# Patient Record
Sex: MALE | Race: WHITE | Employment: FULL TIME | URBAN - METROPOLITAN AREA
[De-identification: names, ages, dates, MRNs, and addresses within clinical notes are randomized per-mention and may not be internally consistent; named-entity substitution may affect disease eponyms.]

---

## 2021-05-28 ENCOUNTER — RECORDS - HEALTHEAST (OUTPATIENT)
Dept: ADMINISTRATIVE | Facility: CLINIC | Age: 66
End: 2021-05-28

## 2021-05-31 ENCOUNTER — RECORDS - HEALTHEAST (OUTPATIENT)
Dept: ADMINISTRATIVE | Facility: CLINIC | Age: 66
End: 2021-05-31

## 2021-07-13 ENCOUNTER — RECORDS - HEALTHEAST (OUTPATIENT)
Dept: ADMINISTRATIVE | Facility: CLINIC | Age: 66
End: 2021-07-13

## 2023-02-17 ENCOUNTER — TRANSFERRED RECORDS (OUTPATIENT)
Dept: HEALTH INFORMATION MANAGEMENT | Facility: CLINIC | Age: 68
End: 2023-02-17

## 2023-05-10 ENCOUNTER — TRANSFERRED RECORDS (OUTPATIENT)
Dept: HEALTH INFORMATION MANAGEMENT | Facility: CLINIC | Age: 68
End: 2023-05-10

## 2023-05-10 ENCOUNTER — MEDICAL CORRESPONDENCE (OUTPATIENT)
Dept: HEALTH INFORMATION MANAGEMENT | Facility: CLINIC | Age: 68
End: 2023-05-10

## 2023-05-16 ENCOUNTER — TRANSCRIBE ORDERS (OUTPATIENT)
Dept: OTHER | Age: 68
End: 2023-05-16

## 2023-05-16 DIAGNOSIS — H20.9 UVEITIS: Primary | ICD-10-CM

## 2023-05-16 DIAGNOSIS — H20.021 IRITIS, RECURRENT, RIGHT: ICD-10-CM

## 2023-06-04 ENCOUNTER — HEALTH MAINTENANCE LETTER (OUTPATIENT)
Age: 68
End: 2023-06-04

## 2023-06-21 ENCOUNTER — OFFICE VISIT (OUTPATIENT)
Dept: OPHTHALMOLOGY | Facility: CLINIC | Age: 68
End: 2023-06-21
Attending: OPHTHALMOLOGY
Payer: COMMERCIAL

## 2023-06-21 DIAGNOSIS — D31.31 CHOROIDAL NEVUS OF RIGHT EYE: ICD-10-CM

## 2023-06-21 DIAGNOSIS — H20.00 HLA-B27 ASSOCIATED ACUTE ANTERIOR UVEITIS: Primary | ICD-10-CM

## 2023-06-21 PROCEDURE — G0463 HOSPITAL OUTPT CLINIC VISIT: HCPCS | Performed by: OPHTHALMOLOGY

## 2023-06-21 PROCEDURE — 99203 OFFICE O/P NEW LOW 30 MIN: CPT | Mod: GC | Performed by: OPHTHALMOLOGY

## 2023-06-21 RX ORDER — VIT C/B6/B5/MAGNESIUM/HERB 173 50-5-6-5MG
CAPSULE ORAL
COMMUNITY

## 2023-06-21 RX ORDER — CHLORHEXIDINE GLUCONATE ORAL RINSE 1.2 MG/ML
SOLUTION DENTAL
COMMUNITY
Start: 2022-10-28

## 2023-06-21 RX ORDER — DIFLUPREDNATE OPHTHALMIC 0.5 MG/ML
EMULSION OPHTHALMIC
COMMUNITY
Start: 2023-05-10

## 2023-06-21 ASSESSMENT — TONOMETRY
OS_IOP_MMHG: 16
OD_IOP_MMHG: 15
IOP_METHOD: TONOPEN

## 2023-06-21 ASSESSMENT — EXTERNAL EXAM - RIGHT EYE: OD_EXAM: NORMAL

## 2023-06-21 ASSESSMENT — CONF VISUAL FIELD
OD_INFERIOR_NASAL_RESTRICTION: 0
OS_SUPERIOR_NASAL_RESTRICTION: 0
OS_SUPERIOR_TEMPORAL_RESTRICTION: 0
OD_SUPERIOR_NASAL_RESTRICTION: 0
OD_INFERIOR_TEMPORAL_RESTRICTION: 0
OD_NORMAL: 1
OS_NORMAL: 1
OS_INFERIOR_NASAL_RESTRICTION: 0
OD_SUPERIOR_TEMPORAL_RESTRICTION: 0
OS_INFERIOR_TEMPORAL_RESTRICTION: 0
METHOD: COUNTING FINGERS

## 2023-06-21 ASSESSMENT — VISUAL ACUITY
OS_CC+: -2
OD_CC: 20/25
CORRECTION_TYPE: GLASSES
METHOD: SNELLEN - LINEAR
OS_CC: 20/20

## 2023-06-21 ASSESSMENT — SLIT LAMP EXAM - LIDS
COMMENTS: NORMAL
COMMENTS: NORMAL

## 2023-06-21 ASSESSMENT — EXTERNAL EXAM - LEFT EYE: OS_EXAM: NORMAL

## 2023-06-21 ASSESSMENT — CUP TO DISC RATIO
OS_RATIO: 0.2
OD_RATIO: 0.2

## 2023-06-21 NOTE — PATIENT INSTRUCTIONS
Please continue the Difluprednate once a day with the last dose on Wed, 6/28  Keep the appointment with Dr. Alberts and let us know if there are any questions  We will let you know about any other ideas for supplements or other treatments.

## 2023-06-21 NOTE — PROGRESS NOTES
Chief Complaint/Presenting Concern: Recurrent iritis, right eye    History of Present Illness:   Olman Walton is a 67 year old patient who presents for evaluation of recurrent iritis right eye.     Mr. Walton reports the uveitis symptoms in the right eye started about 3 years ago. He saw a doctor at [facility] when symptoms started, who diagnosed him with uveitis of the right eye. His symptoms have been occurring more frequently over the past year and typically include redness,very mildly pain and mild photophobia and respond to steroid drops. He can sense a flare starting when he has a headache behind the right eye and it builds and then the flare occurs. This is some ear sensation when this happens but never builds to anything. The left eye may have had one episode previously. When flares occur, Mr. Walton will see Eye Doctor at [facility] and has been typically prescribed a taper of durezol drops.     Mr. Walton saw Dr. Becerra at [facility] on 5/10/23, who noted no cell or flare in the right eye, but Mr. Walton was noticing pain and irritation. He was restarted on a durezol drop taper for the right eye. About one week ago the right eye started to have symptoms, so he saw another doctor at [facility] who then restarted Durezol daily in the right eye.     Today, Mr. Walton reports the right eye feels no discomfort and the redness feels at baseline. He has been taking durezol once per day in the right eye for about one week. No big changes in chronic floaters. No flashes of light. His symptoms do not seem to have any seasonal variation, no history of allergies. Mr. Walton has not ever had to take oral steroid for this eye inflammation.    Additional Ocular History:   1. Myopia with glasses wear (no contacts).     Relevant Past Medical/Family/Social History:  1. HLA-B27 positive. He saw Rheumatology 3/13/23, who did not identity any spondyloarthropathy.   2. No other medical history per  "patient. No history of DM, HTN, HLD, cancers, strokes, blood clots, or other issues.   3. No COVID infection, and has been vaccinated against COVID  4. History of chicken pox in childhood but no history of shingles.     No family history of autoimmune problems. No recent travel out of the country. Not aware of any bug or tick bites. No drug use. No pets at home.Will travel to [place] in September for his Son's Wedding.    Works in an [employer] office. No work exposures to chemicals     Relevant Review of Systems:  Infrequently gets mild cold sores aphthous ulcers in the mouth.    No recent increase in muscle or joint pain, describes mild \"age-related\" aches in \"every joint\". No skin rashes. No blood in stool or urine.     Denies chills, fevers, night sweats, unintentional weight loss, chest pain, SOB, abdominal pain, pain on urination, BLE edema.     Laboratory Testin23: HLA-B27 positive.   23: Normal/negative: Lyme, treponema, quantiferon, CBC, lysozyme, ESR, RF, CRP, ACE, ANCA, DEBBIE, urine albumin, UA.      Current eye related medications: Durezol once daily right eye (for the last week) Turmeric supplements, Fish oil, no drops left eye     Retina/Uveitis Imaging:None     Assessment:    1. HLA-B27 associated acute anterior uveitis  No active inflammation today    2. Choroidal nevus of right eye  Less than 1 disc diameter in size, no high risk features.     Plan/Recommendations:    Discussed findings with patient. The flares of anterior uveitis (iritis) do respond quickly to difluprednate (Durezol) drops.  The pupil dilates well without synechiae and there are some likely aging floaters in the vitreous but no evidence of posterior segment inflammation.    We discussed that the likely cause of the recurrent episodes of iritis in the right eye is the genetic marker HLA-B27.  We reviewed that this typically manifest with uveitis earlier and earlier adulthood, but may not always be the case.  We also " discussed that this entity typically is associated with inflammation of both eyes although less frequently simultaneously.  Mr. Walton does recall perhaps 1 episode in the left eye but there have not been any symptoms in some time nor is there any inflammation in the left eye currently    Given the absence of systemic inflammatory symptoms and a negative evaluation by Rheumatology for underlying associated inflammatory conditions, no other laboratory evaluations are recommended at this time.    Given the history of cold sores there is a possibility of viral mediated anterior uveitis, although typical signs such as iris atrophy, fine stellate keratic precipitates and elevated eye pressure have not been noted.    We discussed that the treatment of flares when they occur with the difluprednate steroid drop has worked well.  We reviewed that there are patients who may have other associated HLA-B27 conditions affecting other parts of the body, and some of these patients are on immunosuppressive therapy to reduce the risk of inflammation of the eye and other body parts.  There does not seem to be an indication for considering such treatment at this time given the episodic nature and prompt response to the steroid drops    We discussed the use of regular maintenance steroid drops but this seems less likely to be needed given episodic nature and risk of steroid related side effects of cataract progression and elevated eye pressure.     Eye pressure is 15/16 today. This can be monitored and is encouraging as there have not been any issues with eye pressure elevation.    Please continue the Difluprednate once a day in the right eye with the last dose on Wed, 6/28/23, then stop     Keep the appointment with Dr. Alberts and let us know if there are any questions    We discussed vitamins and supplements, including Turmeric which Mr. Walton is already taking. We did not have any other recommendations currently but can share with  him later if anything comes to mind.    RTC July 26 with Dr. Alberts at [facility], Here ELIF Zhang MD  Ophthalmology Resident PGY-3    Attending Physician Attestation:  Complete documentation of historical and exam elements from today's encounter can be found in the full encounter summary report (not reduplicated in this progress note). I reviewed the chief complaint(s) and history of present illness, and  confirmed and edited as necessary the review of systems, past medical/surgical history, family history, social history, and examination findings as documented by others and the treating Resident or Fellow Physician.    I examined the patient myself, discussed the findings, reviewed all ancillary testing data and modified these results and reports along with the assessment and plan with the Treating Resident or Fellow Physician. I agree with the note as detailed above.   Jay Munoz M.D.  Uveitis and Medical Retina  June 21, 2023

## 2023-06-21 NOTE — LETTER
6/21/2023       RE: Olman Walton  [address] [address]     Dear Colleague,    Thank you for referring your patient, Olman Walton, to the [facility] at [facility]. Please see a copy of my visit note below.    Chief Complaint/Presenting Concern: Recurrent iritis, right eye    History of Present Illness:   Olman Walton is a 67 year old patient who presents for evaluation of recurrent iritis right eye.     Mr. Walton reports the uveitis symptoms in the right eye started about 3 years ago. He saw a doctor at [facility] when symptoms started, who diagnosed him with uveitis of the right eye. His symptoms have been occurring more frequently over the past year and typically include redness,very mildly pain and mild photophobia and respond to steroid drops. He can sense a flare starting when he has a headache behind the right eye and it builds and then the flare occurs. This is some ear sensation when this happens but never builds to anything. The left eye may have had one episode previously. When flares occur, Mr. Walton will see Eye Doctor at [facility] and has been typically prescribed a taper of durezol drops.     Mr. Walton saw Dr. Becerra at [facility] on 5/10/23, who noted no cell or flare in the right eye, but Mr. Walton was noticing pain and irritation. He was restarted on a durezol drop taper for the right eye. About one week ago the right eye started to have symptoms, so he saw another doctor at [facility] who then restarted Durezol daily in the right eye.     Today, Mr. Walton reports the right eye feels no discomfort and the redness feels at baseline. He has been taking durezol once per day in the right eye for about one week. No big changes in chronic floaters. No flashes of light. His symptoms do not seem to have any seasonal variation, no history of allergies. Mr. Walton has not ever had to take  "oral steroid for this eye inflammation.    Additional Ocular History:   1. Myopia with glasses wear (no contacts).     Relevant Past Medical/Family/Social History:  1. HLA-B27 positive. He saw Rheumatology 3/13/23, who did not identity any spondyloarthropathy.   2. No other medical history per patient. No history of DM, HTN, HLD, cancers, strokes, blood clots, or other issues.   3. No COVID infection, and has been vaccinated against COVID  4. History of chicken pox in childhood but no history of shingles.     No family history of autoimmune problems. No recent travel out of the country. Not aware of any bug or tick bites. No drug use. No pets at home.Will travel to [place] in September for his Son's Wedding.    Works in an [employer] office. No work exposures to chemicals     Relevant Review of Systems:  Infrequently gets mild cold sores aphthous ulcers in the mouth.    No recent increase in muscle or joint pain, describes mild \"age-related\" aches in \"every joint\". No skin rashes. No blood in stool or urine.     Denies chills, fevers, night sweats, unintentional weight loss, chest pain, SOB, abdominal pain, pain on urination, BLE edema.     Laboratory Testin23: HLA-B27 positive.   23: Normal/negative: Lyme, treponema, quantiferon, CBC, lysozyme, ESR, RF, CRP, ACE, ANCA, DEBBIE, urine albumin, UA.      Current eye related medications: Durezol once daily right eye (for the last week) Turmeric supplements, Fish oil, no drops left eye     Retina/Uveitis Imaging:None     Assessment:    1. HLA-B27 associated acute anterior uveitis  No active inflammation today    2. Choroidal nevus of right eye  Less than 1 disc diameter in size, no high risk features.     Plan/Recommendations:  Discussed findings with patient. The flares of anterior uveitis (iritis) do respond quickly to difluprednate (Durezol) drops.  The pupil dilates well without synechiae and there are some likely aging floaters in the vitreous but no " evidence of posterior segment inflammation.  We discussed that the likely cause of the recurrent episodes of iritis in the right eye is the genetic marker HLA-B27.  We reviewed that this typically manifest with uveitis earlier and earlier adulthood, but may not always be the case.  We also discussed that this entity typically is associated with inflammation of both eyes although less frequently simultaneously.  Mr. Walton does recall perhaps 1 episode in the left eye but there have not been any symptoms in some time nor is there any inflammation in the left eye currently  Given the absence of systemic inflammatory symptoms and a negative evaluation by Rheumatology for underlying associated inflammatory conditions, no other laboratory evaluations are recommended at this time.    Given the history of cold sores there is a possibility of viral mediated anterior uveitis, although typical signs such as iris atrophy, fine stellate keratic precipitates and elevated eye pressure have not been noted.  We discussed that the treatment of flares when they occur with the difluprednate steroid drop has worked well.  We reviewed that there are patients who may have other associated HLA-B27 conditions affecting other parts of the body, and some of these patients are on immunosuppressive therapy to reduce the risk of inflammation of the eye and other body parts.  There does not seem to be an indication for considering such treatment at this time given the episodic nature and prompt response to the steroid drops  We discussed the use of regular maintenance steroid drops but this seems less likely to be needed given episodic nature and risk of steroid related side effects of cataract progression and elevated eye pressure.   Eye pressure is 15/16 today. This can be monitored and is encouraging as there have not been any issues with eye pressure elevation.  Please continue the Difluprednate once a day in the right eye with the last dose  on Wed, 6/28/23, then stop   Keep the appointment with Dr. Alberts and let us know if there are any questions  We discussed vitamins and supplements, including Turmeric which Mr. Walton is already taking. We did not have any other recommendations currently but can share with him later if anything comes to mind.    RTC July 26 with Dr. Alberts at [facility], Here ELIF Zhang MD  Ophthalmology Resident PGY-3    Attending Physician Attestation:  Complete documentation of historical and exam elements from today's encounter can be found in the full encounter summary report (not reduplicated in this progress note). I personally obtained the chief complaint(s) and history of present illness. I confirmed and edited as necessary the review of systems, past medical/surgical history, family history, social history, and examination findings as documented by others; and I examined the patient myself. I personally reviewed the relevant tests, images, and reports as documented above. I formulated and edited as necessary the assessment and plan and discussed the findings and management plan with the patient and family.  Jay Munoz MD.      Again, thank you for allowing me to participate in the care of your patient.      Sincerely,    Jay Munoz MD  [facility] Dept of Ophthalmology  Uveitis and Medical Retina

## 2023-06-21 NOTE — NURSING NOTE
Chief Complaints and History of Present Illnesses   Patient presents with     Iritis Evaluation     Chief Complaint(s) and History of Present Illness(es)     Iritis Evaluation            Laterality: right eye    Onset: gradual    Onset: months ago    Quality: States the va is okay    Associated symptoms: floaters (not new).  Negative for dryness, eye pain (only with a flare), redness, tearing, photophobia and flashes    Treatments tried: eye drops    Pain scale: 0/10          Comments    Durezol every day right eye   Last flare was a week ago  Khalida Mcgovern COT 1:07 PM June 21, 2023

## 2023-12-30 ENCOUNTER — HEALTH MAINTENANCE LETTER (OUTPATIENT)
Age: 68
End: 2023-12-30

## 2025-01-18 ENCOUNTER — HEALTH MAINTENANCE LETTER (OUTPATIENT)
Age: 70
End: 2025-01-18

## 2025-02-07 ENCOUNTER — LAB REQUISITION (OUTPATIENT)
Dept: LAB | Facility: CLINIC | Age: 70
End: 2025-02-07
Payer: COMMERCIAL

## 2025-02-07 DIAGNOSIS — M79.645 PAIN IN LEFT FINGER(S): ICD-10-CM

## 2025-02-07 PROCEDURE — 88313 SPECIAL STAINS GROUP 2: CPT | Mod: TC,ORL | Performed by: ORTHOPAEDIC SURGERY

## 2025-02-07 PROCEDURE — 87070 CULTURE OTHR SPECIMN AEROBIC: CPT | Performed by: ORTHOPAEDIC SURGERY

## 2025-02-07 PROCEDURE — 88305 TISSUE EXAM BY PATHOLOGIST: CPT | Mod: TC,ORL | Performed by: ORTHOPAEDIC SURGERY

## 2025-02-07 PROCEDURE — 87075 CULTR BACTERIA EXCEPT BLOOD: CPT | Mod: ORL | Performed by: ORTHOPAEDIC SURGERY

## 2025-02-07 PROCEDURE — 87075 CULTR BACTERIA EXCEPT BLOOD: CPT | Performed by: ORTHOPAEDIC SURGERY

## 2025-02-07 PROCEDURE — 87070 CULTURE OTHR SPECIMN AEROBIC: CPT | Mod: ORL | Performed by: ORTHOPAEDIC SURGERY

## 2025-02-09 LAB — BACTERIA WND CULT: NO GROWTH

## 2025-02-11 LAB
PATH REPORT.COMMENTS IMP SPEC: NORMAL
PATH REPORT.FINAL DX SPEC: NORMAL
PATH REPORT.GROSS SPEC: NORMAL
PATH REPORT.MICROSCOPIC SPEC OTHER STN: NORMAL
PATH REPORT.RELEVANT HX SPEC: NORMAL
PHOTO IMAGE: NORMAL

## 2025-02-11 PROCEDURE — 88305 TISSUE EXAM BY PATHOLOGIST: CPT | Mod: 26 | Performed by: PATHOLOGY

## 2025-02-11 PROCEDURE — 88313 SPECIAL STAINS GROUP 2: CPT | Mod: 26 | Performed by: PATHOLOGY

## 2025-02-13 LAB — BACTERIA WND CULT: NORMAL

## 2025-02-14 LAB — BACTERIA WND CULT: NORMAL
